# Patient Record
Sex: FEMALE | Race: WHITE
[De-identification: names, ages, dates, MRNs, and addresses within clinical notes are randomized per-mention and may not be internally consistent; named-entity substitution may affect disease eponyms.]

---

## 2018-07-07 NOTE — EDM.PDOC
ED HPI GENERAL MEDICAL PROBLEM





- General


Chief Complaint: Chest Pain


Stated Complaint: CHEST DISCOMFORT


Time Seen by Provider: 07/07/18 18:47


Source of Information: Reports: Patient


History Limitations: Reports: No Limitations





- History of Present Illness


INITIAL COMMENTS - FREE TEXT/NARRATIVE: 


HISTORY AND PHYSICAL:





History of present illness:


Patient is a 68-year-old female who presents to the emergency room today with 

complaints of head and neck pain after falling yesterday morning. She states 

she tripped and fell hitting the back of her head, no loss of consciousness. 

She does have muscular tenderness to the trapezius muscles bilaterally. This 

morning she started to have right sided anterior chest pain and numbness to her 

left breast. She denies any fever, chills, shortness of breath, cough. Denies 

any abdominal pain, nausea, vomiting, diarrhea or constipation. No headache, 

change in vision or change in mentation.





Past medical history of quadruple bypass and Type DM II. 





Review of systems: 


As per history of present illness and below otherwise all systems reviewed and 

negative.





Past medical history: 


As per history of present illness and as reviewed below otherwise 

noncontributory.





Surgical history: 


As per history of present illness and as reviewed below otherwise 

noncontributory.





Social history: 


No reported history of drug or alcohol abuse.





Family history: 


As per history of present illness and as reviewed below otherwise 

noncontributory.





Physical exam:


General: Well-developed and well-nourished 68-year-old female. Alert and 

oriented. Nontoxic appearing and in no acute distress.


HEENT: Atraumatic, normocephalic, pupils equal and reactive bilaterally, 

negative for conjunctival pallor or scleral icterus, mucous membranes moist, 

throat clear, neck supple, nontender, trachea midline. No drooling or trismus 

noted. No meningeal signs


Lungs: Clear to auscultation, breath sounds equal bilaterally, chest nontender.


Heart: S1S2, regular rate and rhythm without overt murmur


Abdomen: Soft, nondistended, nontender. Negative for masses or 

hepatosplenomegaly. Negative for costovertebral tenderness.


Pelvis: Stable nontender.


Genitourinary: Deferred.


Rectal: Deferred.


Skin: Intact, warm, dry. No lesions or rashes noted.


Extremities: Atraumatic, negative for cords or calf pain. Neurovascular 

unremarkable.


Neuro: Awake, alert, oriented. Cranial nerves II through XII unremarkable. 

Cerebellum unremarkable. Motor and sensory unremarkable throughout. Exam 

nonfocal.





Notes:


There is no previous medical records or EKGs for comparison. A cardiac workup 

along with head and neck CT will be completed at this time. Patient's vitals 

are currently stable.





Head and neck CT are within normal limits. Chest x-ray shows no evidence of 

pneumonia or infiltrate. Lab work is unremarkable. Though nonreproducible, 

patient's pain appears to be musculature in nature but Due to the patient's 

history and previous complaints and would like to keep her for observation with 

telemetry to do serial troponins. Patient is aware of this and agreeable to 

staying. She is currently pain-free.





Diagnostics:


CBC, CMP, troponin, EKG, one view chest, CT head, CT cervical spine





Therapeutics:


Aspirin, nitroglycerin, Toradol, IV fluid





Impression: 


Chest pain r/o MI


Head injury





Plan:


Observation with Telemetry





Definitive disposition and diagnosis as appropriate pending reevaluation and 

review of above.





  ** Chest


Pain Score (Numeric/FACES): 4





- Related Data


 Allergies











Allergy/AdvReac Type Severity Reaction Status Date / Time


 


No Known Allergies Allergy   Verified 07/07/18 18:59











Home Meds: 


 Home Meds





Aspirin [Halfprin] 81 mg PO DAILY 07/07/18 [History]


Fenofibrate Nanocrystallized [Fenofibrate] 48 mg PO DAILY 07/07/18 [History]


Insulin Degludec [Tresiba Flextouch U-200] 8 units SQ DAILY 07/07/18 [History]


Losartan/Hydrochlorothiazide [Losartan-HCTZ 100-25 MG] 1 each PO DAILY 07/07/18 

[History]


Metoprolol Tartrate [Lopressor] 25 mg PO Q12HR 07/07/18 [History]


Zolpidem [Ambien] 5 mg PO BEDTIME PRN 07/07/18 [History]


atorvaSTATin [Lipitor] 40 mg PO BEDTIME 07/07/18 [History]


glipiZIDE [Glipizide ER] 10 mg PO DAILY 07/07/18 [History]


metFORMIN HCl [Metformin HCl ER] 1,000 mg PO DAILY 07/07/18 [History]











Past Medical History


Cardiovascular History: Reports: Bypass


Endocrine/Metabolic History: Reports: Diabetes, Type II





Social & Family History





- Tobacco Use


Smoking Status *Q: Current Every Day Smoker


Years of Tobacco use: 40


Packs/Tins Daily: 0.5





ED ROS GENERAL





- Review of Systems


Review Of Systems: ROS reveals no pertinent complaints other than HPI.





ED EXAM, GENERAL





- Physical Exam


Exam: See Below (See dictation)





Course





- Vital Signs


Last Recorded V/S: 


 Last Vital Signs











Temp      


 


Pulse  67   07/07/18 18:57


 


Resp  18   07/07/18 18:57


 


BP  154/50 H  07/07/18 18:57


 


Pulse Ox  96   07/07/18 18:57














- Orders/Labs/Meds


Orders: 


 Active Orders 24 hr











 Category Date Time Status


 


 EKG Documentation Completion [RC] STAT Care  07/07/18 18:47 Active


 


 Cervical Spine wo Cont [CT] Stat Exams  07/07/18 19:03 Taken


 


 Chest 1V Frontal [CR] Stat Exams  07/07/18 18:47 Taken


 


 Head wo Cont [CT] Stat Exams  07/07/18 18:59 Taken


 


 CBC WITH AUTO DIFF [HEME] Stat Lab  07/07/18 18:55 Received


 


 COMPREHENSIVE METABOLIC PN,CMP [CHEM] Stat Lab  07/07/18 18:55 Received


 


 TROPONIN I [CHEM] Stat Lab  07/07/18 18:55 Received


 


 Nitroglycerin [Nitrostat] Med  07/07/18 18:48 Active





 0.4 mg SL Q5M PRN   


 


 Sodium Chloride 0.9% [Normal Saline] 1,000 ml Med  07/07/18 19:09 Active





 IV STAT   


 


 Sodium Chloride 0.9% [Saline Flush] Med  07/07/18 18:48 Active





 10 ml FLUSH ASDIRECTED PRN   


 


 Sodium Chloride 0.9% [Saline Flush] Med  07/07/18 18:48 Active





 2.5 ml FLUSH ASDIRECTED PRN   


 


 Saline Lock Insert [OM.PC] Stat Oth  07/07/18 18:48 Ordered








 Medication Orders





Sodium Chloride (Normal Saline)  1,000 mls @ 125 mls/hr IV STAT ONE


   Stop: 07/08/18 03:08


   Last Admin: 07/07/18 20:44  Dose: 125 mls/hr


Nitroglycerin (Nitrostat)  0.4 mg SL Q5M PRN


   PRN Reason: Chest Pain


Sodium Chloride (Saline Flush)  10 ml FLUSH ASDIRECTED PRN


   PRN Reason: Keep Vein Open


Sodium Chloride (Saline Flush)  2.5 ml FLUSH ASDIRECTED PRN


   PRN Reason: Keep Vein Open








Meds: 


Medications











Generic Name Dose Route Start Last Admin





  Trade Name Freq  PRN Reason Stop Dose Admin


 


Sodium Chloride  1,000 mls @ 125 mls/hr  07/07/18 19:09  07/07/18 20:44





  Normal Saline  IV  07/08/18 03:08  125 mls/hr





  STAT ONE   Administration





     





     





     





     


 


Nitroglycerin  0.4 mg  07/07/18 18:48  





  Nitrostat  SL   





  Q5M PRN   





  Chest Pain   





     





     





     


 


Sodium Chloride  10 ml  07/07/18 18:48  





  Saline Flush  FLUSH   





  ASDIRECTED PRN   





  Keep Vein Open   





     





     





     


 


Sodium Chloride  2.5 ml  07/07/18 18:48  





  Saline Flush  FLUSH   





  ASDIRECTED PRN   





  Keep Vein Open   





     





     





     














Discontinued Medications














Generic Name Dose Route Start Last Admin





  Trade Name Freq  PRN Reason Stop Dose Admin


 


Aspirin  324 mg  07/07/18 18:48  07/07/18 20:44





  Aspirin  PO  07/07/18 18:49  324 mg





  ONETIME ONE   Administration





     





     





     





     


 


Ketorolac Tromethamine  30 mg  07/07/18 19:04  07/07/18 20:45





  Toradol  IVPUSH  07/07/18 19:05  30 mg





  ONETIME ONE   Administration





     





     





     





     














Departure





- Departure


Time of Disposition: 20:51


Disposition: Refer to Observation


Clinical Impression: 


 Chest pain, rule out acute myocardial infarction





Head injury


Qualifiers:


 Encounter type: initial encounter Qualified Code(s): S09.90XA - Unspecified 

injury of head, initial encounter





Referrals: 


PCP,None [Primary Care Provider] - 


Forms:  ED Department Discharge





- My Orders


Last 24 Hours: 


My Active Orders





07/07/18 18:47


EKG Documentation Completion [RC] STAT 


Chest 1V Frontal [CR] Stat 





07/07/18 18:48


Nitroglycerin [Nitrostat]   0.4 mg SL Q5M PRN 


Sodium Chloride 0.9% [Saline Flush]   10 ml FLUSH ASDIRECTED PRN 


Sodium Chloride 0.9% [Saline Flush]   2.5 ml FLUSH ASDIRECTED PRN 


Saline Lock Insert [OM.PC] Stat 





07/07/18 18:55


CBC WITH AUTO DIFF [HEME] Stat 


COMPREHENSIVE METABOLIC PN,CMP [CHEM] Stat 


TROPONIN I [CHEM] Stat 





07/07/18 18:59


Head wo Cont [CT] Stat 





07/07/18 19:03


Cervical Spine wo Cont [CT] Stat 





07/07/18 19:09


Sodium Chloride 0.9% [Normal Saline] 1,000 ml IV STAT 














- Assessment/Plan


Last 24 Hours: 


My Active Orders





07/07/18 18:47


EKG Documentation Completion [RC] STAT 


Chest 1V Frontal [CR] Stat 





07/07/18 18:48


Nitroglycerin [Nitrostat]   0.4 mg SL Q5M PRN 


Sodium Chloride 0.9% [Saline Flush]   10 ml FLUSH ASDIRECTED PRN 


Sodium Chloride 0.9% [Saline Flush]   2.5 ml FLUSH ASDIRECTED PRN 


Saline Lock Insert [OM.PC] Stat 





07/07/18 18:55


CBC WITH AUTO DIFF [HEME] Stat 


COMPREHENSIVE METABOLIC PN,CMP [CHEM] Stat 


TROPONIN I [CHEM] Stat 





07/07/18 18:59


Head wo Cont [CT] Stat 





07/07/18 19:03


Cervical Spine wo Cont [CT] Stat 





07/07/18 19:09


Sodium Chloride 0.9% [Normal Saline] 1,000 ml IV STAT

## 2018-07-08 NOTE — PCM.HP
H&P History of Present Illness





- General


Date of Service: 07/07/18


Admit Problem/Dx: 


 Admission Diagnosis/Problem





Admission Diagnosis/Problem      Chest pain, rule out acute myocardial 

infarction











- History of Present Illness


Initial Comments - Free Text/Narative: 





69 yo female with pmh of CAD, CABG, HTN, and DM who fell yesterday and landed 

on her back and head.  She has reported head and neck pain and on the day of 

admission she had some right sided chest wall pain.  In the ED she had normal 

CT head and neck. EKG and troponin did not show any signs of ischemia.  The ED 

provider referred for admission to rule out ACS.


  ** Chest


Pain Score (Numeric/FACES): 1





  ** Head


Pain Score (Numeric/FACES): 4





- Related Data


Allergies/Adverse Reactions: 


 Allergies











Allergy/AdvReac Type Severity Reaction Status Date / Time


 


No Known Allergies Allergy   Verified 07/07/18 18:59











Home Medications: 


 Home Meds





Aspirin [Halfprin] 81 mg PO DAILY 07/07/18 [History]


Fenofibrate Nanocrystallized [Fenofibrate] 48 mg PO DAILY 07/07/18 [History]


Insulin Degludec [Tresiba Flextouch U-200] 8 units SQ DAILY 07/07/18 [History]


Losartan/Hydrochlorothiazide [Losartan-HCTZ 100-25 MG] 1 each PO DAILY 07/07/18 

[History]


Metoprolol Tartrate [Lopressor] 25 mg PO Q12HR 07/07/18 [History]


Zolpidem [Ambien] 5 mg PO BEDTIME PRN 07/07/18 [History]


atorvaSTATin [Lipitor] 40 mg PO BEDTIME 07/07/18 [History]


glipiZIDE [Glipizide ER] 10 mg PO DAILY 07/07/18 [History]


metFORMIN HCl [Metformin HCl ER] 1,000 mg PO DAILY 07/07/18 [History]











Past Medical History


HEENT History: Reports: Glaucoma


Cardiovascular History: Reports: Bypass


Endocrine/Metabolic History: Reports: Diabetes, Type II





- Past Surgical History


HEENT Surgical History: Reports: Other (See Below)


Other HEENT Surgeries/Procedures: UNKNOWN EYE SURGERY


Female  Surgical History: Reports: Tubal Ligation





Social & Family History





- Tobacco Use


Smoking Status *Q: Current Every Day Smoker


Years of Tobacco use: 48


Packs/Tins Daily: 0.2


Used Tobacco, but Quit: Yes


Month/Year Tobacco Last Used: JULY/2018


Second Hand Smoke Exposure: No





- Caffeine Use


Caffeine Use: Reports: None





- Recreational Drug Use


Recreational Drug Use: No





H&P Review of Systems





- Review of Systems:


Review Of Systems: ROS reveals no pertinent complaints other than HPI.





Exam





- Exam


Exam: See Below





- Vital Signs


Vital Signs: 


 Last Vital Signs











Temp  36.2 C   07/08/18 08:00


 


Pulse  54 L  07/08/18 08:10


 


Resp  16   07/08/18 08:00


 


BP  129/58 L  07/08/18 08:10


 


Pulse Ox  98   07/08/18 08:00











Weight: 162.4 kg





- Exam


General: Alert, Oriented


Neck: Supple, Trachea Midline


Lungs: Clear to Auscultation, Normal Respiratory Effort


Cardiovascular: Regular Rate, Regular Rhythm


GI/Abdominal Exam: Normal Bowel Sounds, Soft, Non-Tender


Extremities: Normal Inspection, Normal Range of Motion, Non-Tender.  No: No 

Pedal Edema


Skin: Warm, Dry, Intact





- Patient Data


Lab Results Last 24 hrs: 


 Laboratory Results - last 24 hr











  07/07/18 07/07/18 07/08/18 Range/Units





  18:55 18:55 00:40 


 


WBC  8.59    (4.0-11.0)  K/uL


 


RBC  3.93 L    (4.30-5.90)  M/uL


 


Hgb  12.1    (12.0-16.0)  g/dL


 


Hct  33.9 L    (36.0-46.0)  %


 


MCV  86.3    (80.0-98.0)  fL


 


MCH  30.8    (27.0-32.0)  pg


 


MCHC  35.7    (31.0-37.0)  g/dL


 


RDW Std Deviation  40.4    (28.0-62.0)  fl


 


RDW Coeff of Summer  13    (11.0-15.0)  %


 


Plt Count  232    (150-400)  K/uL


 


MPV  9.50    (7.40-12.00)  fL


 


Neut % (Auto)  56.0    (48.0-80.0)  %


 


Lymph % (Auto)  28.9    (16.0-40.0)  %


 


Mono % (Auto)  10.6    (0.0-15.0)  %


 


Eos % (Auto)  4.0    (0.0-7.0)  %


 


Baso % (Auto)  0.5    (0.0-1.5)  %


 


Neut # (Auto)  4.8    (1.4-5.7)  K/uL


 


Lymph # (Auto)  2.5 H    (0.6-2.4)  K/uL


 


Mono # (Auto)  0.9 H    (0.0-0.8)  K/uL


 


Eos # (Auto)  0.3    (0.0-0.7)  K/uL


 


Baso # (Auto)  0.0    (0.0-0.1)  K/uL


 


Nucleated RBC %  0.0    /100WBC


 


Nucleated RBCs #  0    K/uL


 


Sodium   134 L   (136-145)  mmol/L


 


Potassium   3.8   (3.5-5.1)  mmol/L


 


Chloride   102   ()  mmol/L


 


Carbon Dioxide   25.7   (21.0-32.0)  mmol/L


 


BUN   27 H   (7.0-18.0)  mg/dL


 


Creatinine   1.2 H   (0.6-1.0)  mg/dL


 


Est Cr Clr Drug Dosing   34.80   mL/min


 


Estimated GFR (MDRD)   44.9   ml/min


 


Glucose   283 H   ()  mg/dL


 


POC Glucose     ()  mg/dL


 


Calcium   9.3   (8.5-10.1)  mg/dL


 


Total Bilirubin   0.4   (0.2-1.0)  mg/dL


 


AST   29   (15-37)  IU/L


 


ALT   39   (14-63)  IU/L


 


Alkaline Phosphatase   124 H   ()  U/L


 


Troponin I   < 0.050  < 0.050  (0.000-0.056)  ng/mL


 


Total Protein   7.3   (6.4-8.2)  g/dL


 


Albumin   3.8   (3.4-5.0)  g/dL


 


Globulin   3.5   (2.0-3.5)  g/dL


 


Albumin/Globulin Ratio   1.1 L   (1.3-2.8)  














  07/08/18 07/08/18 Range/Units





  06:26 07:04 


 


WBC    (4.0-11.0)  K/uL


 


RBC    (4.30-5.90)  M/uL


 


Hgb    (12.0-16.0)  g/dL


 


Hct    (36.0-46.0)  %


 


MCV    (80.0-98.0)  fL


 


MCH    (27.0-32.0)  pg


 


MCHC    (31.0-37.0)  g/dL


 


RDW Std Deviation    (28.0-62.0)  fl


 


RDW Coeff of Summer    (11.0-15.0)  %


 


Plt Count    (150-400)  K/uL


 


MPV    (7.40-12.00)  fL


 


Neut % (Auto)    (48.0-80.0)  %


 


Lymph % (Auto)    (16.0-40.0)  %


 


Mono % (Auto)    (0.0-15.0)  %


 


Eos % (Auto)    (0.0-7.0)  %


 


Baso % (Auto)    (0.0-1.5)  %


 


Neut # (Auto)    (1.4-5.7)  K/uL


 


Lymph # (Auto)    (0.6-2.4)  K/uL


 


Mono # (Auto)    (0.0-0.8)  K/uL


 


Eos # (Auto)    (0.0-0.7)  K/uL


 


Baso # (Auto)    (0.0-0.1)  K/uL


 


Nucleated RBC %    /100WBC


 


Nucleated RBCs #    K/uL


 


Sodium    (136-145)  mmol/L


 


Potassium    (3.5-5.1)  mmol/L


 


Chloride    ()  mmol/L


 


Carbon Dioxide    (21.0-32.0)  mmol/L


 


BUN    (7.0-18.0)  mg/dL


 


Creatinine    (0.6-1.0)  mg/dL


 


Est Cr Clr Drug Dosing    mL/min


 


Estimated GFR (MDRD)    ml/min


 


Glucose    ()  mg/dL


 


POC Glucose  61   ()  mg/dL


 


Calcium    (8.5-10.1)  mg/dL


 


Total Bilirubin    (0.2-1.0)  mg/dL


 


AST    (15-37)  IU/L


 


ALT    (14-63)  IU/L


 


Alkaline Phosphatase    ()  U/L


 


Troponin I   < 0.050  (0.000-0.056)  ng/mL


 


Total Protein    (6.4-8.2)  g/dL


 


Albumin    (3.4-5.0)  g/dL


 


Globulin    (2.0-3.5)  g/dL


 


Albumin/Globulin Ratio    (1.3-2.8)  











Result Diagrams: 


 07/07/18 18:55





 07/07/18 18:55


Problem List Initiated/Reviewed/Updated: Yes


Orders Last 24hrs: 


 Active Orders 24 hr











 Category Date Time Status


 


 Admission Status [Patient Status] [ADT] Stat ADT  07/07/18 20:53 Active


 


 Accu Check [Blood Glucose Check, Bedside] [RC] TIDAC Care  07/07/18 22:29 

Active


 


 Communication Order [RC] [id number] Care  07/08/18 09:00 Active


 


 Ready for Discharge [RC] PER UNIT ROUTINE Care  07/08/18 09:24 Ordered


 


 Telemetry Monitor Discontinue [Cardiac Monitoring Care  07/08/18 09:25 Active





 Discontinue] [RC] Click to Edit   


 


 Telemetry Monitoring [Cardiac Monitoring] [RC] Q8H Care  07/07/18 21:15 Active


 


 ADA Diabetic [American Diabetic Association Diet] [DIET Diet  07/08/18 

Breakfast Active





 ]   


 


 Cervical Spine wo Cont [CT] Stat Exams  07/07/18 19:03 Taken


 


 Chest 1V Frontal [CR] Stat Exams  07/07/18 18:47 Taken


 


 Head wo Cont [CT] Stat Exams  07/07/18 18:59 Taken


 


 Acetaminophen [Tylenol] Med  07/07/18 22:34 Active





 650 mg PO Q6H PRN   


 


 Aspirin [Halfprin] Med  07/08/18 21:00 Active





 81 mg PO BEDTIME   


 


 Hydrochlorothiazide/Losartan [Hyzaar 50-12.5 MG] Med  07/08/18 21:00 Active





 2 tab PO BEDTIME   


 


 Insulin Aspart [NovoLOG] Med  07/08/18 07:30 Active





 See Protocol  SUBCUT TIDAC   


 


 Metoprolol Tartrate [Lopressor] Med  07/08/18 09:00 Active





 25 mg PO Q12HR   


 


 Nitroglycerin [Nitrostat] Med  07/07/18 18:48 Active





 0.4 mg SL Q5M PRN   


 


 Ondansetron [Zofran] Med  07/07/18 22:32 Active





 4 mg IVPUSH Q3H PRN   


 


 Pantoprazole [ProTONIX***] Med  07/08/18 21:00 Active





 40 mg PO BEDTIME   


 


 Patient's Own Medication [Ptom] Med  07/08/18 09:00 Active





 1 each PO DAILY   


 


 Sodium Chloride 0.9% [Saline Flush] Med  07/07/18 18:48 Active





 10 ml FLUSH ASDIRECTED PRN   


 


 Sodium Chloride 0.9% [Saline Flush] Med  07/07/18 18:48 Active





 2.5 ml FLUSH ASDIRECTED PRN   


 


 Zaleplon [Sonata] Med  07/07/18 22:29 Active





 5 mg PO BEDTIME PRN   


 


 atorvaSTATin [Lipitor] Med  07/08/18 21:00 Active





 40 mg PO BEDTIME   


 


 Saline Lock Insert [OM.PC] Stat Oth  07/07/18 18:48 Ordered








 Medication Orders





Acetaminophen (Tylenol)  650 mg PO Q6H PRN


   PRN Reason: Pain


   Last Admin: 07/08/18 06:44  Dose: 650 mg


   Admin: 07/07/18 23:26  Dose: 650 mg


Aspirin (Halfprin)  81 mg PO BEDTIME NOMAN


Atorvastatin Calcium (Lipitor)  40 mg PO BEDTIME NOMAN


HCTZ/Losartan Potassium (Hyzaar 50-12.5 Mg)  2 tab PO BEDTIME NOMAN


Insulin Aspart (Novolog)  0 unit SUBCUT TIDAC [facility]; Protocol


   Last Admin: 07/08/18 06:53 Dose:  Not Given


Metoprolol Tartrate (Lopressor)  25 mg PO Q12HR [facility]


   Last Admin: 07/08/18 08:10  Dose: 25 mg


Nitroglycerin (Nitrostat)  0.4 mg SL Q5M PRN


   PRN Reason: Chest Pain


Ondansetron HCl (Zofran)  4 mg IVPUSH Q3H PRN


   PRN Reason: Nausea


Pantoprazole Sodium (Protonix***)  40 mg PO BEDTIME [facility]


   Last Admin: 07/07/18 23:50  Dose: 40 mg


Fenofibrate 48 Mg  1 each PO DAILY [facility]


   Last Admin: 07/08/18 08:11  Dose: 1 each


Sodium Chloride (Saline Flush)  10 ml FLUSH ASDIRECTED PRN


   PRN Reason: Keep Vein Open


Sodium Chloride (Saline Flush)  2.5 ml FLUSH ASDIRECTED PRN


   PRN Reason: Keep Vein Open


Zaleplon (Sonata)  5 mg PO BEDTIME PRN


   PRN Reason: Sleep








Assessment/Plan Comment:: 





69 yo female who has ruled out for acute coronary syndrome with serial negative 

cardiac enzymes.  Patient was discharged home to have follow up with her 

cardiologist.

## 2018-07-09 NOTE — CT
EXAM DATE: 18



PATIENT'S AGE: 68



Patient: KENDRA BAKER



Facility: [place], [place]

Patient ID: [id number]

Site Patient ID: [id number].

Site Accession #: [id number]

: 1950

Study: CT Spine Cervical [id number]/7/2018 8:10:53 PM

Ordering Physician: Doctor Temp



Final Report: 

INDICATION: Fell and hit head yesterday. Neck pain



TECHNIQUE: CT cervical spine without i.v. contrast. Coronal and sagittal 
reformats were obtained.



CONTRAST: None



COMPARISON: None



FINDINGS: 



Alignment: Mild kyphosis of the upper cervical spine is noted. Trace 
anterolisthesis C3-4 seen. 



Bone: No acute fractures or aggressive bone lesions are identified. 



Disc: There are degenerative disc disease noted at C4-5, C5-6 and C6-7. 
Scattered facet osteoarthritis is noted bilaterally. 



Soft tissue: The prevertebral soft tissues are unremarkable in appearance. The 
visualized lung apices and mediastinum are unremarkable. 



IMPRESSION: 

1. No acute osseous injuries are identified.



Dictated by Babar Carney MD @ 2018 8:39:11 PM



Please note that all CT scans at this facility use dose modulation, iterative 
reconstruction, and/or weight-based dosing when appropriate to reduce radiation 
dose to as low as reasonably achievable.



Dictated by: Babar Carney MD @ 2018 20:39:14

(Electronic Signature)





Report Signed by Proxy.
[facility]

## 2018-07-09 NOTE — CT
EXAM DATE: 18



PATIENT'S AGE: 68



Patient: KENDRA BAKER



Facility: [place], [place]

Patient ID: [id number]

Site Patient ID: [id number].

Site Accession #: [id number].

: 1950

Study: CT Head [id number]/7/2018 8:14:27 PM

Ordering Physician: Doctor Temp



Final Report: 

INDICATION:

Fell and hit head yesterday 



TECHNIQUE:

Head CT without contrast.



COMPARISON:

None 



FINDINGS:

CSF spaces: Within normal limits for age. 



Brain parenchyma: There are very mild nonspecific low attenuation white matter 
changes consistent with chronic microvascular disease. No sign of mass, 
hemorrhage, or midline shift. 



Skull base and calvarium: The visualized paranasal sinuses and mastoid air 
cells demonstrate no acute or significant findings. The visualized orbits are 
grossly unremarkable. No skull fractures. There is intracranial 
atherosclerosis. 



IMPRESSION:

1. No acute findings. 

2. Mild nonspecific white matter disease, typical of chronic microvascular 
disease.



Please note that all CT scans at this facility use dose modulation, iterative 
reconstruction, and/or weight-based dosing when appropriate to reduce radiation 
dose to as low as reasonably achievable.



Dictated by Katie Rick MD @ 2018 8:39PM

(Electronic Signature)





Report Signed by Proxy.
[facility]
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
## 2019-07-30 ENCOUNTER — HOSPITAL ENCOUNTER (EMERGENCY)
Dept: HOSPITAL 56 - MW.ED | Age: 69
Discharge: HOME | End: 2019-07-30
Payer: MEDICARE
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
## 2019-07-30 DIAGNOSIS — E11.9: ICD-10-CM

## 2019-07-30 DIAGNOSIS — Z87.891: ICD-10-CM

## 2019-07-30 DIAGNOSIS — Z79.82: ICD-10-CM

## 2019-07-30 DIAGNOSIS — Z98.51: ICD-10-CM

## 2019-07-30 DIAGNOSIS — Z79.4: ICD-10-CM

## 2019-07-30 DIAGNOSIS — S53.125A: Primary | ICD-10-CM

## 2019-07-30 PROCEDURE — 96375 TX/PRO/DX INJ NEW DRUG ADDON: CPT

## 2019-07-30 PROCEDURE — 73110 X-RAY EXAM OF WRIST: CPT

## 2019-07-30 PROCEDURE — 99283 EMERGENCY DEPT VISIT LOW MDM: CPT

## 2019-07-30 PROCEDURE — 96374 THER/PROPH/DIAG INJ IV PUSH: CPT

## 2019-07-30 PROCEDURE — 73080 X-RAY EXAM OF ELBOW: CPT

## 2019-07-30 PROCEDURE — 82962 GLUCOSE BLOOD TEST: CPT

## 2019-07-30 PROCEDURE — 73030 X-RAY EXAM OF SHOULDER: CPT

## 2019-07-30 PROCEDURE — 73070 X-RAY EXAM OF ELBOW: CPT

## 2019-07-30 PROCEDURE — 96361 HYDRATE IV INFUSION ADD-ON: CPT

## 2019-07-30 PROCEDURE — 24600 TX CLSD ELBOW DISLC W/O ANES: CPT
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
## 2019-07-30 NOTE — CR
INDICATION: Elbow dislocation status Post reduction



TECHNIQUE: Elbow radiograph 3 views left



COMPARISON: 07/30/2019



FINDINGS: 



Bone: No acute fractures or aggressive bone lesions are identified. 



Joint: The elbow joint is unremarkable. No significant displacement of the 

anterior or posterior fat pads noted to suggest an effusion. 



Soft tissue: Unremarkable. No radiopaque foreign bodies are seen. 



IMPRESSION: 



1. The elbow has been reduced to anatomic alignment. 



Dictated by: Babar Carney MD @ 07/30/2019 19:49:33



(Electronically Signed)
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
